# Patient Record
Sex: MALE
[De-identification: names, ages, dates, MRNs, and addresses within clinical notes are randomized per-mention and may not be internally consistent; named-entity substitution may affect disease eponyms.]

---

## 2018-08-30 ENCOUNTER — RX ONLY (OUTPATIENT)
Age: 63
Setting detail: RX ONLY
End: 2018-08-30

## 2018-08-30 RX ORDER — BETAMETHASONE VALERATE 1.2 MG/G
AEROSOL, FOAM TOPICAL
Qty: 1 | Refills: 1 | Status: CANCELLED
Stop reason: CLARIF

## 2018-08-31 ENCOUNTER — RX ONLY (OUTPATIENT)
Age: 63
Setting detail: RX ONLY
End: 2018-08-31

## 2018-08-31 RX ORDER — BETAMETHASONE VALERATE 1.2 MG/G
AEROSOL, FOAM TOPICAL
Qty: 1 | Refills: 6 | Status: ERX

## 2019-12-26 ENCOUNTER — RX ONLY (OUTPATIENT)
Age: 64
Setting detail: RX ONLY
End: 2019-12-26

## 2019-12-26 RX ORDER — BETAMETHASONE VALERATE 1.2 MG/G
AEROSOL, FOAM TOPICAL
Qty: 1 | Refills: 0 | Status: ERX | COMMUNITY
Start: 2019-12-26

## 2020-02-17 ENCOUNTER — APPOINTMENT (RX ONLY)
Dept: URBAN - METROPOLITAN AREA CLINIC 26 | Facility: CLINIC | Age: 65
Setting detail: DERMATOLOGY
End: 2020-02-17

## 2020-02-17 DIAGNOSIS — D18.0 HEMANGIOMA: ICD-10-CM

## 2020-02-17 DIAGNOSIS — L81.4 OTHER MELANIN HYPERPIGMENTATION: ICD-10-CM

## 2020-02-17 DIAGNOSIS — L40.0 PSORIASIS VULGARIS: ICD-10-CM | Status: WELL CONTROLLED

## 2020-02-17 DIAGNOSIS — D22 MELANOCYTIC NEVI: ICD-10-CM

## 2020-02-17 DIAGNOSIS — L82.1 OTHER SEBORRHEIC KERATOSIS: ICD-10-CM

## 2020-02-17 PROBLEM — D18.01 HEMANGIOMA OF SKIN AND SUBCUTANEOUS TISSUE: Status: ACTIVE | Noted: 2020-02-17

## 2020-02-17 PROBLEM — D22.9 MELANOCYTIC NEVI, UNSPECIFIED: Status: ACTIVE | Noted: 2020-02-17

## 2020-02-17 PROCEDURE — 99214 OFFICE O/P EST MOD 30 MIN: CPT

## 2020-02-17 PROCEDURE — ? PRESCRIPTION

## 2020-02-17 PROCEDURE — ? COUNSELING

## 2020-02-17 PROCEDURE — ? TREATMENT REGIMEN

## 2020-02-17 RX ORDER — FLUOCINONIDE 1 MG/G
1 CREAM TOPICAL
Qty: 1 | Refills: 6 | Status: ERX | COMMUNITY
Start: 2020-02-17

## 2020-02-17 RX ORDER — BETAMETHASONE VALERATE 1.2 MG/G
1 AEROSOL, FOAM TOPICAL
Qty: 1 | Refills: 6 | Status: ERX | COMMUNITY
Start: 2020-02-17

## 2020-02-17 RX ADMIN — FLUOCINONIDE 1: 1 CREAM TOPICAL at 00:00

## 2020-02-17 RX ADMIN — BETAMETHASONE VALERATE 1: 1.2 AEROSOL, FOAM TOPICAL at 00:00

## 2020-02-17 ASSESSMENT — LOCATION DETAILED DESCRIPTION DERM
LOCATION DETAILED: LEFT ANTERIOR LATERAL PROXIMAL THIGH
LOCATION DETAILED: LEFT CENTRAL PARIETAL SCALP

## 2020-02-17 ASSESSMENT — LOCATION SIMPLE DESCRIPTION DERM
LOCATION SIMPLE: LEFT THIGH
LOCATION SIMPLE: SCALP

## 2020-02-17 ASSESSMENT — LOCATION ZONE DERM
LOCATION ZONE: LEG
LOCATION ZONE: SCALP

## 2020-02-17 NOTE — PROCEDURE: TREATMENT REGIMEN
Detail Level: Simple
Action 3: Continue
Start Regimen: betamethasone valerate 0.12 % topical foam Qday-bid\\nVanos 0.1 % topical cream Qday-bid

## 2020-02-17 NOTE — HPI: MEDICATION
Since Your Last Visit, How Has Your Condition Changed?: unchanged
What Is The Medication?: Betamethasone foam
When Did You Start The Medicine?: 12/26/2019

## 2021-06-18 ENCOUNTER — APPOINTMENT (RX ONLY)
Dept: URBAN - METROPOLITAN AREA CLINIC 26 | Facility: CLINIC | Age: 66
Setting detail: DERMATOLOGY
End: 2021-06-18

## 2021-06-18 DIAGNOSIS — L82.1 OTHER SEBORRHEIC KERATOSIS: ICD-10-CM

## 2021-06-18 DIAGNOSIS — L40.0 PSORIASIS VULGARIS: ICD-10-CM

## 2021-06-18 DIAGNOSIS — L90.8 OTHER ATROPHIC DISORDERS OF SKIN: ICD-10-CM

## 2021-06-18 PROCEDURE — ? TREATMENT REGIMEN

## 2021-06-18 PROCEDURE — ? PRESCRIPTION SAMPLES PROVIDED

## 2021-06-18 PROCEDURE — ? PRESCRIPTION

## 2021-06-18 PROCEDURE — ? ADDITIONAL NOTES

## 2021-06-18 PROCEDURE — ? COUNSELING

## 2021-06-18 PROCEDURE — 99213 OFFICE O/P EST LOW 20 MIN: CPT

## 2021-06-18 RX ORDER — BETAMETHASONE VALERATE 1.2 MG/G
AEROSOL, FOAM TOPICAL BID
Qty: 1 | Refills: 6 | Status: ERX | COMMUNITY
Start: 2021-06-18

## 2021-06-18 RX ADMIN — BETAMETHASONE VALERATE: 1.2 AEROSOL, FOAM TOPICAL at 00:00

## 2021-06-18 ASSESSMENT — LOCATION DETAILED DESCRIPTION DERM
LOCATION DETAILED: LEFT CENTRAL PARIETAL SCALP
LOCATION DETAILED: LEFT ANTERIOR LATERAL PROXIMAL THIGH
LOCATION DETAILED: LEFT DISTAL CALF
LOCATION DETAILED: RIGHT VENTRAL PROXIMAL FOREARM
LOCATION DETAILED: LEFT PROXIMAL POSTERIOR UPPER ARM

## 2021-06-18 ASSESSMENT — LOCATION SIMPLE DESCRIPTION DERM
LOCATION SIMPLE: RIGHT FOREARM
LOCATION SIMPLE: LEFT THIGH
LOCATION SIMPLE: SCALP
LOCATION SIMPLE: LEFT CALF
LOCATION SIMPLE: LEFT UPPER ARM

## 2021-06-18 ASSESSMENT — LOCATION ZONE DERM
LOCATION ZONE: SCALP
LOCATION ZONE: LEG
LOCATION ZONE: ARM

## 2021-06-18 NOTE — PROCEDURE: ADDITIONAL NOTES
Additional Notes: Patient consent was obtained to proceed with the visit and recommended plan of care after discussion of all risks and benefits, including the risks of COVID-19 exposure.
Detail Level: Simple
Additional Notes: Chronological aging with muscle atrophy
Render Risk Assessment In Note?: no

## 2021-06-18 NOTE — PROCEDURE: TREATMENT REGIMEN
Continue Regimen: Beta Mike one dominique rate foam as directed
Detail Level: Simple
Action 3: Continue
Samples Given: Enstilar

## 2024-07-29 ENCOUNTER — APPOINTMENT (RX ONLY)
Dept: URBAN - METROPOLITAN AREA CLINIC 23 | Facility: CLINIC | Age: 69
Setting detail: DERMATOLOGY
End: 2024-07-29

## 2024-07-29 DIAGNOSIS — L28.1 PRURIGO NODULARIS: ICD-10-CM

## 2024-07-29 DIAGNOSIS — Z80.8 FAMILY HISTORY OF MALIGNANT NEOPLASM OF OTHER ORGANS OR SYSTEMS: ICD-10-CM

## 2024-07-29 DIAGNOSIS — L81.4 OTHER MELANIN HYPERPIGMENTATION: ICD-10-CM

## 2024-07-29 DIAGNOSIS — D18.0 HEMANGIOMA: ICD-10-CM

## 2024-07-29 DIAGNOSIS — L82.1 OTHER SEBORRHEIC KERATOSIS: ICD-10-CM

## 2024-07-29 DIAGNOSIS — D22 MELANOCYTIC NEVI: ICD-10-CM

## 2024-07-29 DIAGNOSIS — L40.0 PSORIASIS VULGARIS: ICD-10-CM

## 2024-07-29 PROBLEM — D22.5 MELANOCYTIC NEVI OF TRUNK: Status: ACTIVE | Noted: 2024-07-29

## 2024-07-29 PROBLEM — D18.01 HEMANGIOMA OF SKIN AND SUBCUTANEOUS TISSUE: Status: ACTIVE | Noted: 2024-07-29

## 2024-07-29 PROCEDURE — ? TREATMENT REGIMEN

## 2024-07-29 PROCEDURE — 99204 OFFICE O/P NEW MOD 45 MIN: CPT

## 2024-07-29 PROCEDURE — ? COUNSELING

## 2024-07-29 PROCEDURE — ? SUNSCREEN RECOMMENDATIONS

## 2024-07-29 PROCEDURE — ? PRESCRIPTION

## 2024-07-29 PROCEDURE — ? FULL BODY SKIN EXAM

## 2024-07-29 RX ORDER — BETAMETHASONE VALERATE 1.2 MG/G
AEROSOL, FOAM TOPICAL
Qty: 50 | Refills: 11 | Status: ERX

## 2024-07-29 RX ORDER — KETOCONAZOLE 20 MG/ML
SHAMPOO, SUSPENSION TOPICAL
Qty: 120 | Refills: 7 | Status: ERX | COMMUNITY
Start: 2024-07-29

## 2024-07-29 RX ADMIN — KETOCONAZOLE: 20 SHAMPOO, SUSPENSION TOPICAL at 00:00

## 2024-07-29 ASSESSMENT — LOCATION SIMPLE DESCRIPTION DERM
LOCATION SIMPLE: SCALP
LOCATION SIMPLE: UPPER BACK
LOCATION SIMPLE: LEFT THIGH
LOCATION SIMPLE: POSTERIOR SCALP
LOCATION SIMPLE: POSTERIOR SCALP

## 2024-07-29 ASSESSMENT — LOCATION ZONE DERM
LOCATION ZONE: SCALP
LOCATION ZONE: TRUNK
LOCATION ZONE: SCALP
LOCATION ZONE: LEG

## 2024-07-29 ASSESSMENT — LOCATION DETAILED DESCRIPTION DERM
LOCATION DETAILED: MID-OCCIPITAL SCALP
LOCATION DETAILED: LEFT INFERIOR OCCIPITAL SCALP
LOCATION DETAILED: LEFT ANTERIOR LATERAL PROXIMAL THIGH
LOCATION DETAILED: RIGHT INFERIOR OCCIPITAL SCALP
LOCATION DETAILED: RIGHT OCCIPITAL SCALP
LOCATION DETAILED: SUPERIOR THORACIC SPINE
LOCATION DETAILED: LEFT CENTRAL PARIETAL SCALP

## 2024-07-29 NOTE — PROCEDURE: TREATMENT REGIMEN
Continue Regimen: Betamethasone dipropionate foam as directed
Detail Level: Simple
Action 3: Continue

## 2024-08-06 ENCOUNTER — RX ONLY (OUTPATIENT)
Age: 69
Setting detail: RX ONLY
End: 2024-08-06

## 2024-08-06 RX ORDER — FLUOCINONIDE 1 MG/G
CREAM TOPICAL
Qty: 60 | Refills: 4 | Status: ERX | COMMUNITY
Start: 2024-08-06

## 2025-07-14 ENCOUNTER — RX ONLY (RX ONLY)
Age: 70
End: 2025-07-14

## 2025-07-14 ENCOUNTER — APPOINTMENT (OUTPATIENT)
Dept: URBAN - METROPOLITAN AREA CLINIC 23 | Facility: CLINIC | Age: 70
Setting detail: DERMATOLOGY
End: 2025-07-14

## 2025-07-14 DIAGNOSIS — L40.0 PSORIASIS VULGARIS: ICD-10-CM

## 2025-07-14 DIAGNOSIS — L82.0 INFLAMED SEBORRHEIC KERATOSIS: ICD-10-CM

## 2025-07-14 PROBLEM — D48.5 NEOPLASM OF UNCERTAIN BEHAVIOR OF SKIN: Status: ACTIVE | Noted: 2025-07-14

## 2025-07-14 PROCEDURE — ? PRESCRIPTION

## 2025-07-14 PROCEDURE — ? PRESCRIPTION MEDICATION MANAGEMENT

## 2025-07-14 PROCEDURE — ? DIAGNOSIS COMMENT

## 2025-07-14 PROCEDURE — ? COUNSELING

## 2025-07-14 RX ORDER — KETOCONAZOLE 20 MG/ML
SHAMPOO, SUSPENSION TOPICAL
Qty: 120 | Refills: 7 | Status: ERX

## 2025-07-14 RX ORDER — TACROLIMUS 1 MG/G
OINTMENT TOPICAL BID PRN
Qty: 60 | Refills: 3 | Status: ERX | COMMUNITY
Start: 2025-07-14

## 2025-07-14 RX ADMIN — TACROLIMUS: 1 OINTMENT TOPICAL at 00:00

## 2025-07-14 ASSESSMENT — LOCATION SIMPLE DESCRIPTION DERM
LOCATION SIMPLE: RIGHT EAR
LOCATION SIMPLE: CHEST
LOCATION SIMPLE: LEFT EAR
LOCATION SIMPLE: RIGHT FOREARM

## 2025-07-14 ASSESSMENT — LOCATION DETAILED DESCRIPTION DERM
LOCATION DETAILED: LEFT CRUS OF HELIX
LOCATION DETAILED: STERNUM
LOCATION DETAILED: RIGHT VENTRAL PROXIMAL FOREARM
LOCATION DETAILED: RIGHT CYMBA CONCHA

## 2025-07-14 ASSESSMENT — LOCATION ZONE DERM
LOCATION ZONE: ARM
LOCATION ZONE: EAR
LOCATION ZONE: TRUNK

## 2025-07-14 ASSESSMENT — BSA PSORIASIS: % BODY COVERED IN PSORIASIS: 5

## 2025-07-14 NOTE — PROCEDURE: PRESCRIPTION MEDICATION MANAGEMENT
Continue Regimen: Betamethasone prn no more than 2 weeks per spot \\nVanos PRN
Detail Level: Zone
Initiate Treatment: tacrolimus 0.1 % topical ointment BID PRN
Render In Strict Bullet Format?: No

## 2025-07-14 NOTE — PROCEDURE: DIAGNOSIS COMMENT
Render Risk Assessment In Note?: no
Detail Level: Detailed
Comment: Pt reports trying betamethasone foam on chest twice a day for a few days and not helping \\nDid have issues with vertigo for the past 3 weeks and has been stressed about that diagnosis possibly leading to flare